# Patient Record
Sex: MALE | Race: WHITE | NOT HISPANIC OR LATINO | URBAN - METROPOLITAN AREA
[De-identification: names, ages, dates, MRNs, and addresses within clinical notes are randomized per-mention and may not be internally consistent; named-entity substitution may affect disease eponyms.]

---

## 2017-01-10 ENCOUNTER — TELEPHONE (OUTPATIENT)
Dept: NUTRITION | Facility: CLINIC | Age: 22
End: 2017-01-10

## 2017-02-08 DIAGNOSIS — E70.1 PHENYLKETONURIA (PKU) (H): Primary | ICD-10-CM

## 2017-02-08 PROCEDURE — 84510 ASSAY OF TYROSINE: CPT | Performed by: PEDIATRICS

## 2017-02-16 ENCOUNTER — TELEPHONE (OUTPATIENT)
Dept: NUTRITION | Facility: CLINIC | Age: 22
End: 2017-02-16

## 2017-02-16 LAB
PHE SERPL-MCNC: 3.9 MG/DL (ref 0.5–1.6)
TYROSINE SERPL-MCNC: 1.9 MG/DL (ref 0.6–2.4)

## 2017-04-03 PROCEDURE — 84510 ASSAY OF TYROSINE: CPT | Performed by: PEDIATRICS

## 2017-04-11 DIAGNOSIS — E70.1 PHENYLKETONURIA (PKU) (H): Primary | ICD-10-CM

## 2017-04-12 LAB
PHE SERPL-MCNC: 10.2 MG/DL (ref 0.5–1.6)
TYROSINE SERPL-MCNC: 1.4 MG/DL (ref 0.6–2.4)

## 2017-06-04 DIAGNOSIS — E70.1 PHENYLKETONURIA (PKU) (H): ICD-10-CM

## 2017-06-04 PROCEDURE — 84510 ASSAY OF TYROSINE: CPT | Performed by: PEDIATRICS

## 2017-06-09 LAB
PHE SERPL-MCNC: 12.2 MG/DL (ref 0.5–1.6)
TYROSINE SERPL-MCNC: 1.6 MG/DL (ref 0.6–2.4)

## 2017-06-20 ENCOUNTER — TELEPHONE (OUTPATIENT)
Dept: NUTRITION | Facility: CLINIC | Age: 22
End: 2017-06-20

## 2017-08-07 DIAGNOSIS — E70.1 PHENYLKETONURIA (PKU) (H): ICD-10-CM

## 2017-08-07 PROCEDURE — 84510 ASSAY OF TYROSINE: CPT | Performed by: PEDIATRICS

## 2017-08-14 LAB
PHE SERPL-MCNC: 10.8 MG/DL (ref 0.5–1.6)
TYROSINE SERPL-MCNC: 1.8 MG/DL (ref 0.6–2.4)

## 2017-08-15 ENCOUNTER — TELEPHONE (OUTPATIENT)
Dept: NUTRITION | Facility: CLINIC | Age: 22
End: 2017-08-15

## 2017-09-29 DIAGNOSIS — E70.1 PHENYLKETONURIA (PKU) (H): ICD-10-CM

## 2017-09-29 PROCEDURE — 84510 ASSAY OF TYROSINE: CPT | Performed by: PEDIATRICS

## 2017-10-06 LAB
PHE SERPL-MCNC: 10.6 MG/DL (ref 0.5–1.6)
TYROSINE SERPL-MCNC: 1.7 MG/DL (ref 0.6–2.4)

## 2017-10-10 ENCOUNTER — TELEPHONE (OUTPATIENT)
Dept: NUTRITION | Facility: CLINIC | Age: 22
End: 2017-10-10

## 2017-12-31 ENCOUNTER — HOSPITAL ENCOUNTER (OUTPATIENT)
Facility: CLINIC | Age: 22
Setting detail: SPECIMEN
Discharge: HOME | End: 2017-12-31
Admitting: PEDIATRICS
Payer: COMMERCIAL

## 2017-12-31 PROCEDURE — 84510 ASSAY OF TYROSINE: CPT | Performed by: PEDIATRICS

## 2018-01-06 DIAGNOSIS — E70.1 PHENYLKETONURIA (PKU) (H): ICD-10-CM

## 2018-01-09 LAB
PHE SERPL-MCNC: 11.9 MG/DL (ref 0.5–1.6)
TYROSINE SERPL-MCNC: 1.9 MG/DL (ref 0.6–2.4)

## 2018-01-12 ENCOUNTER — TELEPHONE (OUTPATIENT)
Dept: NUTRITION | Facility: CLINIC | Age: 23
End: 2018-01-12

## 2018-03-01 DIAGNOSIS — E70.1 PHENYLKETONURIA (PKU) (H): ICD-10-CM

## 2018-03-01 PROCEDURE — 84510 ASSAY OF TYROSINE: CPT | Performed by: PEDIATRICS

## 2018-03-08 LAB
PHE SERPL-MCNC: 10.6 MG/DL (ref 0.5–1.6)
TYROSINE SERPL-MCNC: 1.9 MG/DL (ref 0.6–2.4)

## 2018-03-09 ENCOUNTER — TELEPHONE (OUTPATIENT)
Dept: NUTRITION | Facility: CLINIC | Age: 23
End: 2018-03-09

## 2018-05-14 DIAGNOSIS — E70.1 PHENYLKETONURIA (PKU) (H): ICD-10-CM

## 2018-05-14 PROCEDURE — 84510 ASSAY OF TYROSINE: CPT | Performed by: PEDIATRICS

## 2018-05-21 LAB
PHE SERPL-MCNC: 9.7 MG/DL (ref 0.5–1.6)
TYROSINE SERPL-MCNC: 1.2 MG/DL (ref 0.6–2.4)

## 2018-05-22 ENCOUNTER — TELEPHONE (OUTPATIENT)
Dept: NUTRITION | Facility: CLINIC | Age: 23
End: 2018-05-22

## 2018-05-22 NOTE — TELEPHONE ENCOUNTER
Clinical Nutrition Services - brief note     PHE result collected 05/14/2018 given to patient's mother via e-mail = 9.7 mg/dL.     Linda Lackey RD, LD  Unit Pager: [phone]

## 2018-09-21 DIAGNOSIS — E70.1 PHENYLKETONURIA (PKU) (H): ICD-10-CM

## 2018-09-21 PROCEDURE — 84510 ASSAY OF TYROSINE: CPT | Performed by: PEDIATRICS

## 2018-09-25 LAB
PHE SERPL-MCNC: 7.6 MG/DL (ref 0.5–1.6)
TYROSINE SERPL-MCNC: 1.6 MG/DL (ref 0.6–2.4)

## 2018-09-28 ENCOUNTER — TELEPHONE (OUTPATIENT)
Dept: NUTRITION | Facility: CLINIC | Age: 23
End: 2018-09-28

## 2018-12-03 DIAGNOSIS — E70.1 PHENYLKETONURIA (PKU) (H): ICD-10-CM

## 2018-12-03 PROCEDURE — 84510 ASSAY OF TYROSINE: CPT | Performed by: PEDIATRICS

## 2018-12-11 LAB
PHE SERPL-MCNC: 10.7 MG/DL (ref 0.5–1.6)
TYROSINE SERPL-MCNC: 1.7 MG/DL (ref 0.6–2.4)

## 2019-01-21 ENCOUNTER — OFFICE VISIT (OUTPATIENT)
Dept: PEDIATRICS | Facility: CLINIC | Age: 24
End: 2019-01-21
Attending: PEDIATRICS
Payer: COMMERCIAL

## 2019-01-21 ENCOUNTER — OFFICE VISIT (OUTPATIENT)
Dept: CONSULT | Facility: CLINIC | Age: 24
End: 2019-01-21

## 2019-01-21 ENCOUNTER — ALLIED HEALTH/NURSE VISIT (OUTPATIENT)
Dept: PEDIATRICS | Facility: CLINIC | Age: 24
End: 2019-01-21
Attending: DIETITIAN, REGISTERED
Payer: COMMERCIAL

## 2019-01-21 VITALS
SYSTOLIC BLOOD PRESSURE: 136 MMHG | DIASTOLIC BLOOD PRESSURE: 80 MMHG | HEIGHT: 69 IN | WEIGHT: 160.05 LBS | HEART RATE: 93 BPM | BODY MASS INDEX: 23.71 KG/M2

## 2019-01-21 DIAGNOSIS — E70.1 PHENYLKETONURIA (PKU) (H): Primary | ICD-10-CM

## 2019-01-21 DIAGNOSIS — N20.0 KIDNEY STONE: ICD-10-CM

## 2019-01-21 DIAGNOSIS — Z71.9 ENCOUNTER FOR COUNSELING: Primary | ICD-10-CM

## 2019-01-21 PROCEDURE — 97803 MED NUTRITION INDIV SUBSEQ: CPT | Mod: XU | Performed by: DIETITIAN, REGISTERED

## 2019-01-21 PROCEDURE — G0463 HOSPITAL OUTPT CLINIC VISIT: HCPCS | Mod: ZF

## 2019-01-21 ASSESSMENT — PAIN SCALES - GENERAL: PAINLEVEL: NO PAIN (0)

## 2019-01-21 ASSESSMENT — MIFFLIN-ST. JEOR: SCORE: 1715.99

## 2019-01-21 NOTE — NURSING NOTE
"[facility] [[id number]]  Chief Complaint   Patient presents with     RECHECK     follow up     Initial /80   Pulse 93   Ht 5' 9.29\" (176 cm)   Wt 160 lb 0.9 oz (72.6 kg)   HC 58 cm (22.84\")   BMI 23.44 kg/m   Estimated body mass index is 23.44 kg/m  as calculated from the following:    Height as of this encounter: 5' 9.29\" (176 cm).    Weight as of this encounter: 160 lb 0.9 oz (72.6 kg).  Medication Reconciliation: complete  "

## 2019-01-21 NOTE — PROGRESS NOTES
A partnership of [place] and [facility]   Advanced Therapies  [facility] [address]  [address] [facility] [address]  Phone: [phone]  Fax: [phone]  Date: 2019      Patient:  Rashaad Silva   :   1995   MRN:     [id number]      Rashaad Silva   [address] [facility] [address]    Dear Alvaro Fleming and Rashaad Silva,    CHIEF COMPLAINT:     Thank you for sending Rashaad Silva to the PKU and Maternal PKU Clinic at the [facility] for consultation regarding phenylketonuria (PKU). This patient is 23 year old and comes for evaluation and treatment.      Since the last visit, there have been no hospitalizations, emergency department visits, or other major changes in medical care.    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 Provider to review         FAMILY HISTORY: A brief family medical history was reviewed.  MEDICATIONS:   Current Outpatient Medications   Medication Sig     multivitamin, therapeutic with minerals (MULTI-VITAMIN) TABS Take 1 tablet by mouth daily     Sapropterin Dihydrochloride (KUVAN) 100 MG PACK Take 100 mg by mouth daily with food (Patient not taking: Reported on 2019)     Sapropterin Dihydrochloride (KUVAN) 500 MG PACK Take 1,500 mg by mouth daily with food (Patient not taking: Reported on 2019)     No current facility-administered medications for this visit.      REVIEW OF SYSTEMS: The review of systems negative for new eye,  ear, heart, lung, liver, spleen, gastrointestinal, bone, muscle, integumentary, endocrinologic, brain or psychiatric issues except as noted above.  PHYSICAL EXAMINATION:   General: The patient is oriented to person, place and time at an age-appropriate manner.   HEENT: The facial features are normal and symmetric.   The gaze is conjugate and extraocular motions are full and intact.The pupils are equal, round and reactive to light.  The ears are of normal position and configuration and hearing is grossly normal.  The oropharynx is benign and the tongue protrudes normally without fasciculations.  Neck: The neck is supple with full range of motion  Chest: The chest is of normal configuration and clear by auscultation.   Heart: A normal S1 and S2 are heard without murmurs or gallops.  Abdomen: The abdomen is soft and benign without organomegaly.   Extremities: The extremities are of normal configuration without contractures nor hyperlaxities. Strength and tone appear to be normal and symmetric. Deep tendon reflexes are normal at the biceps, patellar and ankles, and there is no clonus at the ankles.   Integument: The integument is  of normal appearance without significant changes in pigmentation, birthmarks, or lesions.  Neurologic:  Mental Status Exam:  Alert, awake. Fully oriented. No dysarthria, no dysphasia. Speech of normal fluency.  Cranial Nerves:  PERRLA, EOMs intact, no nystagmus, facial movements symmetric. No atrophy or fasciculations.    Motor:  Normal tone in all four extremities, no atrophy or fasciculations. 5/5 strength bilaterally in shoulder abduction, elbow extensors and flexors, , hip flexors, knee extensors and flexors. No tremors.  Sensory:  Negative Romberg.  Reflexes:  2+ and symmetric in biceps, patellar, Achilles; There is no clonus at the ankles.  Gait:  Normal gait; normal arm swing and stance.ankles.    LABORATORY RESULTS: Laboratory studies from the past year were reviewed.   "Historically, Luis Eduardo has shown blood phenylalanine levels slightly over the surgery recommended level, ranging 7.6-10.6 in 2018.  ASSESSMENT:  1.) Phenylketonuria (PKU).  2.) Moderately good control with blood phe levels aiming at levels fo 6 mg/dL or lower.  In February 2019, he said the blood level in which gave a result of 7.6 mg/dL.  PLAN/RECOMMENDATIONS:    1.) Low phenylalanine diet.  2.) Send blood \"tyrosine and phenylalanine\" levels monthly.  3.) Metabolic PKU Dietician Consult today.  4.) Clinical Pharmacotherapy consultation with Sofie Mckenzie PharmD.  5.) Return to PKU Clinic in 24 months.    FOLLOW-UP PLAN:  If you are returning to clinic to review specific laboratory tests, please call the Genetic Counselor (see phone numbers below)  to confirm that we have received all of the results from reference laboratories prior to your appointment. If we have not received all of the test results, please discuss re-scheduling your appointment.    I spent 30 minutes face-to-face with the patient reviewing the chief complaint, past medical history, and obtaining a review of systems as well as doing a physical examination; more than 50% of this time was spent in counseling regarding the nature of PKU disease, the impact in adulthood of having both high, or alternatively low, blood phenylalanine levels, in particular the value of getting blood phenylalanine levels to less than 6  mi's lliram per deciliter, next regularly recommended goal we discussed new therapies including his past experience with Kuvan medication, as well as a new future medication Palynziq  (pegvaliase-pqpz) which is injectable, just coming into practice for the first time after FDA approval, and the challenges of getting a dose adjustment over the first year of treatment, and also the known, associated issues, chiefly cutaneous rashes and how that would be dealt with..    With warmest regards,     Andrews Villanueva PhD MD  Professor of " Pediatrics, and  [place] of Human Genetics    Watch your mailbox for an invitation to the annual PKU Patient & Family Seminar     Saturday, October 14, 2017   [facility] Continuing Education and Conference [facility]   [facility], [facility]   [address] [place] [address]      Appointments: [phone]      Monday mornings: Advanced Therapies for Lysosomal Diseases Clinic   Monday afternoons: PKU Clinic, Metabolism Clinic, and Genetics Clinic    Pharmacotherapy Consultant:  Sofie Cobb, PharmD, Pharmacotherapy for Metabolic Disorders (PIMD): [phone]  Gray Bowman, PharmD, Pharmacotherapy for Metabolic Disorders (PIMD): [phone]    Genetic Counselor:  Elli Perera MS, CGC (Genetic test Results): [phone]  Era Calles MS, GCG, (Genetic test results: [phone])    Metabolic Dietician:  Kassy Barajas, Registered Dietician: [phone]  Linda Lackey, Registered Dietician: [phone]    :  SABRINA Perkins, [facility], AC, Clinical , [phone]    Advanced Therapies Clinic Scheduler:  Edith Carbajal, [phone]      Copy to Primary Care Practitioner:  Dr. Alvaro Scruggs MD  [facility]  [address] [facility] [address]  [place] [address]      Copy  to patient:  Rashaad Cuevachner   [address] [place] [id number]

## 2019-01-21 NOTE — LETTER
2019    RE: Rashaad Silva   [address] [address]                                                       A partnership of [place] and [facility]   Advanced Therapies  [facility] [address]  [address] [place] [address]  Phone: [phone]  Fax: [phone]  Date: 2019      Patient:  Rashaad Silva   :   1995   MRN:     [id number]      Rashaad Silva   [address] [address]    Dear Alvaro Fleming and Rashaad CATHY Ricardo,    CHIEF COMPLAINT:     Thank you for sending Rashaad Silva to the PKU and Maternal PKU Clinic at the [facility] for consultation regarding phenylketonuria (PKU). This patient is 23 year old and comes for evaluation and treatment.      Since the last visit, there have been no hospitalizations, emergency department visits, or other major changes in medical care.    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 Provider to review         FAMILY HISTORY: A brief family medical history was reviewed.  MEDICATIONS:   Current Outpatient Medications   Medication Sig     multivitamin, therapeutic with minerals (MULTI-VITAMIN) TABS Take 1 tablet by mouth daily     Sapropterin Dihydrochloride (KUVAN) 100 MG PACK Take 100 mg by mouth daily with food (Patient not taking: Reported on 2019)     Sapropterin Dihydrochloride (KUVAN) 500 MG PACK Take 1,500 mg by mouth daily with food (Patient not taking: Reported on 2019)     No current facility-administered medications  for this visit.      REVIEW OF SYSTEMS: The review of systems negative for new eye, ear, heart, lung, liver, spleen, gastrointestinal, bone, muscle, integumentary, endocrinologic, brain or psychiatric issues except as noted above.  PHYSICAL EXAMINATION:   General: The patient is oriented to person, place and time at an age-appropriate manner.   HEENT: The facial features are normal and symmetric.   The gaze is conjugate and extraocular motions are full and intact.The pupils are equal, round and reactive to light.  The ears are of normal position and configuration and hearing is grossly normal.  The oropharynx is benign and the tongue protrudes normally without fasciculations.  Neck: The neck is supple with full range of motion  Chest: The chest is of normal configuration and clear by auscultation.   Heart: A normal S1 and S2 are heard without murmurs or gallops.  Abdomen: The abdomen is soft and benign without organomegaly.   Extremities: The extremities are of normal configuration without contractures nor hyperlaxities. Strength and tone appear to be normal and symmetric. Deep tendon reflexes are normal at the biceps, patellar and ankles, and there is no clonus at the ankles.   Integument: The integument is  of normal appearance without significant changes in pigmentation, birthmarks, or lesions.  Neurologic:  Mental Status Exam:  Alert, awake. Fully oriented. No dysarthria, no dysphasia. Speech of normal fluency.  Cranial Nerves:  PERRLA, EOMs intact, no nystagmus, facial movements symmetric. No atrophy or fasciculations.    Motor:  Normal tone in all four extremities, no atrophy or fasciculations. 5/5 strength bilaterally in shoulder abduction, elbow extensors and flexors, , hip flexors, knee extensors and flexors. No tremors.  Sensory:  Negative Romberg.  Reflexes:  2+ and symmetric in biceps, patellar, Achilles; There is no clonus at the ankles.  Gait:  Normal gait; normal arm swing and  "stance.ankles.    LABORATORY RESULTS: Laboratory studies from the past year were reviewed.  Historically, Luis Eduardo has shown blood phenylalanine levels slightly over the surgery recommended level, ranging 7.6-10.6 in 2018.  ASSESSMENT:  1.) Phenylketonuria (PKU).  2.) Moderately good control with blood phe levels aiming at levels fo 6 mg/dL or lower.  In February 2019, he said the blood level in which gave a result of 7.6 mg/dL.  PLAN/RECOMMENDATIONS:    1.) Low phenylalanine diet.  2.) Send blood \"tyrosine and phenylalanine\" levels monthly.  3.) Metabolic PKU Dietician Consult today.  4.) Clinical Pharmacotherapy consultation with Sofie Mckenzie PharmD.  5.) Return to PKU Clinic in 24 months.    FOLLOW-UP PLAN:  If you are returning to clinic to review specific laboratory tests, please call the Genetic Counselor (see phone numbers below)  to confirm that we have received all of the results from reference laboratories prior to your appointment. If we have not received all of the test results, please discuss re-scheduling your appointment.    I spent 30 minutes face-to-face with the patient reviewing the chief complaint, past medical history, and obtaining a review of systems as well as doing a physical examination; more than 50% of this time was spent in counseling regarding the nature of PKU disease, the impact in adulthood of having both high, or alternatively low, blood phenylalanine levels, in particular the value of getting blood phenylalanine levels to less than 6  mi's lliram per deciliter, next regularly recommended goal we discussed new therapies including his past experience with Kuvan medication, as well as a new future medication Palynziq  (pegvaliase-pqpz) which is injectable, just coming into practice for the first time after FDA approval, and the challenges of getting a dose adjustment over the first year of treatment, and also the known, associated issues, chiefly cutaneous rashes and how that would be " dealt with..    With warmest regards,     Andrews Villanueva PhD MD  Professor of Pediatrics, and  [place] of Human Genetics    Watch your mailbox for an invitation to the annual PKU Patient & Family Seminar     Saturday, October 14, 2017   [facility] Continuing Education and Conference Center   [facility]   [address] [place] [address]      Appointments: [phone]      Monday mornings: Advanced Therapies for Lysosomal Diseases Clinic   Monday afternoons: PKU Clinic, Metabolism Clinic, and Genetics Clinic    Pharmacotherapy Consultant:  Sofie Cobb, PharmD, Pharmacotherapy for Metabolic Disorders (PIMD): [phone]  Gray Bowman, PharmD, Pharmacotherapy for Metabolic Disorders (PIMD): [phone]    Genetic Counselor:  Elli Perera MS, CGC (Genetic test Results): [phone]  Era Calles MS, GCG, (Genetic test results: [phone])    Metabolic Dietician:  Kassy Barajas, Registered Dietician: [phone]  Linda Lackey, Registered Dietician: [phone]    :  Argelia Jacobsen, MSW, [facility], ACM, Clinical , [phone]    Advanced Therapies Clinic Scheduler:  Edith Carbajal, [phone]        Copy to Primary Care Practitioner:  Dr. Alvaro Scruggs MD  [facility]  [address] [facility] [address]  [place] [address]    Copy  to patient:  Rashaad CATHY Silva   [address] [place] [id number]

## 2019-01-21 NOTE — LETTER
1/21/2019      RE: Rashaad Silva   [address] [facility] [address]       ADVANCED THERAPIES CLINIC  SOCIAL WORK NOTE      DATA:     Rashaad presented with his parents and brother for the appointment. Rashaad has PKU and has been managing by adjusting his diet.  He has a history of trying Kuvan but prefers not to restart due to upset stomach at the time of use. He does utilize the [place] PKU Food Program.    Rashaad reports that he is residing at home with his parents, who he appears to get along well with.  He works full-time for a [employer] company as a .  He denied any need for additional resources at this time. Housing, finances, insurance are stable.    Rashaad's parents note that they hope their sons will come on their own for their appointments next time, as they have entered adulthood, but family remains a strong source of support. Rashaad signed an OKSANA to allow communication with his mother, particularly regarding Phe levels.    INTERVENTION:     Assessment of patient's strengths and needs  Encouraged ongoing self-care and continued attention to self-care  Provided positive feedback and encouragement  Case Coordination  Financial Assessment    ASSESSMENT:     Rashaad is motivated to care for his health and manage PKU. No additional social work needs have been identified during this encounter.     PLAN:     Rashaad denied any further needs from this worker, who remains available should any needs arise.      SABRINA Chen, JIMI  Pediatric Genetics, Adult & Pediatric PKU and Clinical Trials Services     [facility]  [address] [place] [address], [address]  [place] [address] [email or web address]  Office: [phone]        JIMI Chen

## 2019-01-22 NOTE — PROGRESS NOTES
ADVANCED THERAPIES CLINIC  SOCIAL WORK NOTE      DATA:     Rashaad presented with his parents and brother for the appointment. Rashaad has PKU and has been managing by adjusting his diet.  He has a history of trying Kuvan but prefers not to restart due to upset stomach at the time of use. He does utilize the [place] PKU Food Program.    Rashaad reports that he is residing at home with his parents, who he appears to get along well with.  He works full-time for a manufacturing company as a .  He denied any need for additional resources at this time. Housing, finances, insurance are stable.    Rashaad's parents note that they hope their sons will come on their own for their appointments next time, as they have entered adulthood, but family remains a strong source of support. Rashaad signed an OKSANA to allow communication with his mother, particularly regarding Phe levels.    INTERVENTION:     Assessment of patient's strengths and needs  Encouraged ongoing self-care and continued attention to self-care  Provided positive feedback and encouragement  Case Coordination  Financial Assessment    ASSESSMENT:     Rashaad is motivated to care for his health and manage PKU. No additional social work needs have been identified during this encounter.     PLAN:     Rashaad denied any further needs from this worker, who remains available should any needs arise.      SABRINA Chen, [facility]  Pediatric Genetics, Adult & Pediatric PKU and Clinical Trials Services     [facility]  [address] [place] [address], [address]  [place] [address] [email or web address]  Office: [phone]

## 2019-01-24 NOTE — PROGRESS NOTES
CLINICAL NUTRITION SERVICES - PEDIATRIC ASSESSMENT NOTE     REASON FOR ASSESSMENT  Rashaad Silva is a 23 year old male seen by the dietitian for consult regarding PKU.       ANTHROPOMETRICS  Height: 175.4 cm (69 in)  Weight: 72.7 kg  BMI: 23.7     Comments: weight down ~14 lbs since visit 2 years ago     NUTRITION HISTORY  Patient follows a low protein diet (undocumented PHE goal; likely 300-450 mg/day).     Usual intake stated as:     Patient states that with his work schedule he typically will eat one large meal a day.  He does not go into specifics on what is typically eaten, but in previous visits common foods are low protein noodles with red sauce or low protein usha sauce, bagel/cream cheese, chips, pizza.  He is here with parents and sibling today and lives at home with them.  Mom notes concern he does not eat very well and has lost weight.  He states it is mainly just due to his schedule and being busy.     PKU FORMULA  Phlexy-10 Drink Mix - 6 packets daily (mixes with apple juice)     This would provide 414 kcals (5 kcal/kg), 48 grams protein (0.7 g/kg/day), no vitamins/mineral - however, patient states most days he is taking only 3 pkts.       LABS  Labs reviewed;               PHE     TYR  12/3 10.7  1.7   7.6 1.6   9.7 1.2  3/1 10.6 1.9  Av.7 1.6     MEDICATIONS  Medications reviewed; includes daily MVI  NOTE:  responsive to Kuvan but discontinued due to side effects     ASSESSED NUTRITION NEEDS:  Estimated Energy Needs: 30-35 kcal/kg  Estimated Protein Needs: RDA for age = 0.8 g/kg, optimal range 0.8-1.2  Estimated PHE Needs: typically < 10 mg/kg/day  Micronutrient Needs: 1300 mg calcium, 600 mg Vitamin D, 11 mg iron     NUTRITION DIAGNOSIS:  Impaired nutrient utilization related to diagnosis of PKU as evidenced by elevated serum PHE levels.     INTERVENTIONS  Nutrition Prescription  Meet 100% estimated kcal, protein, PHE, vitamin/mineral needs through low protein diet + PKU  formula    Nutrition Education:   Provided nutrition education on continuing low protein diet + PKU formula.    Discussed weight/changes, intakes, and most recent PHE levels.    -Discussed nutritional status and suboptimal formula intake which likely means he is not getting enough phe-free protein, as well as other nutrition.  Discussed benefits of possibly trying a other GMP formula (patient has tried Glytactin RTD) to supply more nutrition and have lower volume intake.  Patient agreeable to trying options such as Glytactin RTD Lite, Sphere, Air, and Phenylade GMP.  -Discussed more balanced nutrition of trying to eat 3 meals/day and why this is important for energy levels, PHE levels, overall health.    -Encouraged patient to continue to send levels and aim for <6 mg/dL; reviewed symptoms of high PHE levels in adults and what this can impact.    Goals  1. Weight maintenance  2. Meet >85% estimated kcal, protein, PHE, vitamin/mineral needs through low protein diet + PKU formula  3. PHE levels 2-6 mg/dL    FOLLOW UP/MONITORING  Energy Intake - adequacy  Macronutrient intake - low protein + PKU formula  Anthropometric measurements - weight maintenance     Time spent with patient: 15 minutes

## 2019-02-19 PROCEDURE — 84510 ASSAY OF TYROSINE: CPT | Performed by: PEDIATRICS

## 2019-02-26 LAB
PHE SERPL-MCNC: 7.6 MG/DL (ref 0.5–1.6)
TYROSINE SERPL-MCNC: 2.3 MG/DL (ref 0.6–2.4)

## 2019-03-01 ENCOUNTER — TELEPHONE (OUTPATIENT)
Dept: NUTRITION | Facility: CLINIC | Age: 24
End: 2019-03-01

## 2019-06-16 DIAGNOSIS — E70.1 PHENYLKETONURIA (PKU) (H): ICD-10-CM

## 2019-06-16 PROCEDURE — 84510 ASSAY OF TYROSINE: CPT | Performed by: PEDIATRICS

## 2019-06-24 LAB
PHE SERPL-MCNC: 9.4 MG/DL (ref 0.5–1.6)
TYROSINE SERPL-MCNC: 1.7 MG/DL (ref 0.6–2.4)

## 2019-06-25 ENCOUNTER — TELEPHONE (OUTPATIENT)
Dept: NUTRITION | Facility: CLINIC | Age: 24
End: 2019-06-25

## 2019-08-18 DIAGNOSIS — E70.1 PHENYLKETONURIA (PKU) (H): ICD-10-CM

## 2019-08-18 PROCEDURE — 84510 ASSAY OF TYROSINE: CPT | Performed by: PEDIATRICS

## 2019-08-23 LAB
PHE SERPL-MCNC: 8.8 MG/DL (ref 0.5–1.6)
TYROSINE SERPL-MCNC: 1.3 MG/DL (ref 0.6–2.4)

## 2019-08-27 ENCOUNTER — TELEPHONE (OUTPATIENT)
Dept: NUTRITION | Facility: CLINIC | Age: 24
End: 2019-08-27

## 2019-10-13 PROCEDURE — 84510 ASSAY OF TYROSINE: CPT | Performed by: PEDIATRICS

## 2019-10-21 LAB
PHE SERPL-MCNC: 8.9 MG/DL (ref 0.5–1.6)
TYROSINE SERPL-MCNC: 1.6 MG/DL (ref 0.6–2.4)

## 2019-10-25 DIAGNOSIS — E70.1 PHENYLKETONURIA (PKU) (H): Primary | ICD-10-CM

## 2019-10-31 ENCOUNTER — TELEPHONE (OUTPATIENT)
Dept: NUTRITION | Facility: CLINIC | Age: 24
End: 2019-10-31

## 2019-12-14 DIAGNOSIS — E70.1 PHENYLKETONURIA (PKU) (H): ICD-10-CM

## 2019-12-14 PROCEDURE — 84510 ASSAY OF TYROSINE: CPT | Performed by: PEDIATRICS

## 2019-12-19 LAB
PHE SERPL-MCNC: 12.3 MG/DL (ref 0.5–1.6)
TYROSINE SERPL-MCNC: 2.1 MG/DL (ref 0.6–2.4)

## 2019-12-20 ENCOUNTER — TELEPHONE (OUTPATIENT)
Dept: NUTRITION | Facility: CLINIC | Age: 24
End: 2019-12-20

## 2019-12-23 ENCOUNTER — TELEPHONE (OUTPATIENT)
Dept: NUTRITION | Facility: CLINIC | Age: 24
End: 2019-12-23

## 2020-07-26 DIAGNOSIS — E70.1 PHENYLKETONURIA (PKU) (H): ICD-10-CM

## 2020-07-26 PROCEDURE — 84510 ASSAY OF TYROSINE: CPT | Performed by: PEDIATRICS

## 2020-07-31 LAB
PHE SERPL-MCNC: 11.5 MG/DL (ref 0.5–1.6)
TYROSINE SERPL-MCNC: 1.4 MG/DL (ref 0.6–2.4)

## 2020-08-03 ENCOUNTER — TELEPHONE (OUTPATIENT)
Dept: NUTRITION | Facility: CLINIC | Age: 25
End: 2020-08-03

## 2020-12-28 ENCOUNTER — TELEPHONE (OUTPATIENT)
Dept: CONSULT | Facility: CLINIC | Age: 25
End: 2020-12-28

## 2020-12-28 NOTE — TELEPHONE ENCOUNTER
I tried calling the patient's mother but there was no answer and I was unable to leave a voice message as well. I wanted to set up an appointment for Rashaad to be seen virtually in the PKU clinic with Dr Villanueva.       Ligia Sosa

## 2021-03-15 ENCOUNTER — TELEPHONE (OUTPATIENT)
Dept: CONSULT | Facility: CLINIC | Age: 26
End: 2021-03-15

## 2021-03-15 NOTE — TELEPHONE ENCOUNTER
I left the patient a message about setting a follow-up virtual visit with Dr Villanueva for this following year. Dr. Villanueva would like to do a virtual visit follow-up on how the patient s progress is coming along. Please feel free to contact Dr. Villanueva s coordinator Ligia at [phone] or the Genetics appointment scheduling at [phone] and we ll be happy to help coordinate an appointment with the patient.    Ligia Sosa

## 2021-04-19 ENCOUNTER — VIRTUAL VISIT (OUTPATIENT)
Dept: PEDIATRICS | Facility: CLINIC | Age: 26
End: 2021-04-19
Attending: PEDIATRICS
Payer: COMMERCIAL

## 2021-04-19 ENCOUNTER — VIRTUAL VISIT (OUTPATIENT)
Dept: NUTRITION | Facility: CLINIC | Age: 26
End: 2021-04-19
Attending: DIETITIAN, REGISTERED
Payer: COMMERCIAL

## 2021-04-19 DIAGNOSIS — E70.1 PHENYLKETONURIA (PKU) (H): Primary | Chronic | ICD-10-CM

## 2021-04-19 DIAGNOSIS — E70.1 PHENYLKETONURIA (PKU) (H): ICD-10-CM

## 2021-04-19 PROCEDURE — 97803 MED NUTRITION INDIV SUBSEQ: CPT | Mod: GT | Performed by: DIETITIAN, REGISTERED

## 2021-04-19 PROCEDURE — 99213 OFFICE O/P EST LOW 20 MIN: CPT | Performed by: PEDIATRICS

## 2021-04-19 RX ORDER — SAPROPTERIN DIHYDROCHLORIDE 500 MG/1
1500 POWDER, FOR SOLUTION ORAL
Qty: 90 EACH | Refills: 11 | Status: SHIPPED | OUTPATIENT
Start: 2021-04-19 | End: 2021-11-18

## 2021-04-19 NOTE — PROGRESS NOTES
"How would you like to obtain your AVS? Mail a copy  If the video visit is dropped, the invitation should be resent by: Send to e-mail at: [email or web address]  Will anyone else be joining your video visit? Dr. Gray Bowman (Pharmacist), Dr. Mariela De Jesus (Research physician) and Dr. Joseline Zarco (Clinical physician)     ULISES Waters    Video-Visit Details    Type of service:  Video Visit    Video Start Time: 3:00 PM  Video End Time (time video stopped): 3:40 PM    Originating Location (pt. Location): Home    Distant Location (provider location):  [facility]    Mode of Communication:  Video Conference via [facility]                                                      A partnership of [place] and [facility]   Advanced Therapies  [facility] [address] [place] [address]  Phone: [phone]  Fax: [phone]  Date: 2021      Patient:  Rashaad Silva   :   1995   MRN:     [id number]      Rashaad Silva   [address] [facility] [address]    Dear Rashaad Silva,    CHIEF COMPLAINT:     Thank you for sending Rashaad Silva, a 25 year old male, to the [facility] \"PKU and Maternal PKU Clinic\" for consultation and treatment of phenylketonuria (PKU).    PAST MEDICAL HISTORY:    From the oral history, and medical records that are available, these items are noted:    Patient Active Problem List    Diagnosis Date Noted     Kidney stone 2019     Priority: Medium     Nephrolithiasis 2013     Priority: Medium     Microscopic hematuria 2013     Priority: Medium     Elevated homocysteine 2013     Priority: Medium     B-complex deficiency 10/12/2012     Priority: Medium     Problem list name updated by automated process. Provider to review       PHENYLKETONURIA - PKU, Kuvan responsive      Priority: Sharon Neil has not been on Kuvan for some time due to insurance " issues.He was previously seen on 1/20/2019. No major hospitalizations or surgeries since then.     FAMILY HISTORY: A brief family medical history was reviewed.  REVIEW OF SYSTEMS: The review of systems negative for new eye, ear, heart, lung, liver, spleen, gastrointestinal, bone, muscle, integumentary, endocrinologic, brain or psychiatric issues except as noted above.    PHYSICAL EXAMINATION:   General: The patient is oriented to person, place and time at an age-appropriate manner.   HEENT: The facial features are normal and symmetric. The ears are of normal position and configuration and hearing is grossly normal.  The oropharynx is benign and the tongue protrudes normally without fasciculations.  Neck: The neck appears to be supple with full range of motion  Chest: Does not appear to be tachypneic or in any respiratory distress.  Heart:  Kofi Franklin  appears well perfused.  Abdomen: Not examined.  Extremities: The extremities are of normal configuration.  Back: The back was straight without scoliosis.   Integument: The visible part of the integument is of normal appearance without significant changes in pigmentation, birthmarks, or lesions.  Neuromuscular:  Mental Status Exam: Alert, awake. Fully oriented. No dysarthria, no dysphasia. Speech of normal fluency.     LABORATORY RESULTS: Laboratory studies from the past year were reviewed.  Results for KOFI FRANKLIN (MRN [id number]) as of 4/19/2021 15:02   Ref. Range 6/16/2019 13:15 8/18/2019 15:00 10/13/2019 15:30 12/14/2019 12:00 7/26/2020 14:00   Phenylalanine mg/dl Latest Ref Range: 0.5 - 1.6 mg/dL 9.4 (H) 8.8 (H) 8.9 (H) 12.3 (H) 11.5 (H)   Results for KOFI FRANKLIN (MRN [id number]) as of 4/19/2021 15:02   Ref. Range 6/16/2019 13:15 8/18/2019 15:00 10/13/2019 15:30 12/14/2019 12:00 7/26/2020 14:00   Tyrosine Latest Ref Range: 0.6 - 2.4 mg/dL 1.7 1.3 1.6 2.1 1.4       ASSESSMENT:  1. PKU  2. On Kuvan. But Kofi has not been taking it due to  "insurance issues  3. High Phe levels  4. Not on PKU formulas  5. On Phe restricted diet    PLAN/RECOMMENDATIONS:  1. Continue low phenylalanine diet.  2. Send blood \"tyrosine and phenylalanine\" levels monthly.  3. Metabolic PKU Dietician Consult today.  4. Clinical Pharmacotherapy consultation with Gray Bowman, PharmD.  5. Return to PKU Clinic in  3 months to evaluate Kuvan re-initiation      With warmest regards,     Andrews Villanueva PhD MD  Professor of Pediatrics  Medical Director, Advanced Therapies Program  Medical Director, PKU and Maternal PKU Clinic    Appointments: [phone]      Monday: [facility]ies for Lysosomal Diseases Clinic   Monday PM: PKU Clinic    Kassy Barajas, Registered Dietician: [phone]  Gena Gomez MS, [facility] [phone]  Gray Bowman, PharmD, Pharmacotherapy Specialist: [phone]      Copy to Primary Care Practitioner:      Dr. Alvaro Scruggs  [facility]   [address] [facility] [address]  [place] [facility] [address]    Copy  to patient:  JYOTHI FRANKLIN THOMAS   [address] [place] [id number]      "

## 2021-04-19 NOTE — LETTER
"  4/19/2021      RE: Rashaad Silva   [address] [facility] [address]       ..Rashaad is a 25 year old who is being evaluated via a billable video visit.      How would you like to obtain your AVS? Mail a copy  If the video visit is dropped, the invitation should be resent by: Text to cell phone:   Will anyone else be joining your video visit? No      CLINICAL NUTRITION SERVICES - PEDIATRIC ASSESSMENT NOTE     REASON FOR ASSESSMENT  Rashaad Silva is a 25 year old male seen by the dietitian for consult regarding PKU.       ANTHROPOMETRICS  From 2019  Height: 175.4 cm (69 in)  Weight: 72.7 kg  BMI: 23.7     Comments: per patient, weight is stable    NUTRITION HISTORY  Patient follows a low protein diet (undocumented PHE goal; likely 300-450 mg/day).     Usual intake stated as:     Breakfast (7-8 am): fruit, pizza, or veggie sandwich  Afternoon (4-5 pm): soups, chips/salsa, crackers    -patient now works nights so sleeps most of the day  -feels he has a good understanding of what foods he eats that makes him high.  Mostly eats small snacks and throughout the day.  Most items are \"easy stuff\" and not much prep involved.     PKU FORMULA  PKU Sphere 20 - three packets daily    3 packages daily provides 360 kcals/day (5 kcal/kg), 60 grams PE (0.8 g/kg), 12.6 mcg Vitamin D, 19.8 mg iron, and 1059 mg calcium.  Patient states he aims for 3, but most of the time actually takes 2 because he forgets.    LABS  Labs reviewed;       PHE TYR  7/26/20 11.5 1.4  12/14/19 12.3 2.1  10/13/19 8.9 1.6  8/18/19 8.8 1.3    MEDICATIONS  Medications reviewed; includes daily MVI  NOTE:  responsive to Kuvan but discontinued due to side effects     ASSESSED NUTRITION NEEDS:  Estimated Energy Needs: 30-35 kcal/kg or 0099-1119 kcal/day  Estimated Protein Needs: RDA for age = 0.8 g/kg, optimal range for age/PKU: 0.8-1.2  Estimated PHE Needs: typically < 10 mg/kg/day  Micronutrient Needs: 1000 mg calcium, 600 mg Vitamin D, 8 mg " iron     NUTRITION DIAGNOSIS:  Impaired nutrient utilization related to diagnosis of PKU as evidenced by elevated serum PHE levels.     INTERVENTIONS  Nutrition Prescription  Meet 100% estimated kcal, protein, PHE, vitamin/mineral needs through low protein diet + PKU formula    Nutrition Education:   Provided nutrition education on continuing low protein diet + PKU formula.     Reviewed weight, current intakes, and most recent PHE levels.  -Patient stated that he had discussed with MD/team the possibility of restarting Kuvan.  We discussed this and RD encouraged him to also consider taking it regularly again.  Suggested he send a current level as well as another one once he's been taking Kuvan for a week so he can note changes in blood level and possible changes in symptoms of higher phe levels.    -Encouraged setting reminder on his phone to take all 3 packages Sphere for nutritional adequacy.     Goals  1. Weight maintenance  -goal met per verbal report  2. Meet >85% estimated kcal, protein, PHE, vitamin/mineral needs through low protein diet + PKU formula  -goal not met, not meeting formula needs most days  3. PHE levels 2-6 mg/dL  -goal not met (last level 11.5 mg/dL; but also inadequate submission of blood levels to really assess)    FOLLOW UP/MONITORING  Energy Intake - adequacy  Macronutrient intake - low protein + PKU formula  Anthropometric measurements - weight maintenance    Time spent on phone with patient: 15 minutes     Kassy Barajas RD, LD

## 2021-04-19 NOTE — LETTER
"  2021      RE: Rashaad Silva   [address] [address]       How would you like to obtain your AVS? Mail a copy  If the video visit is dropped, the invitation should be resent by: Send to e-mail at: [email or web address]  Will anyone else be joining your video visit? Dr. Gray Bowman (Pharmacist), Dr. Mariela De Jesus (Research physician) and Dr. Joseline Zarco (Clinical physician)     ULISES Waters    Video-Visit Details    Type of service:  Video Visit    Video Start Time: 3:00 PM  Video End Time (time video stopped): 3:40 PM    Originating Location (pt. Location): Home    Distant Location (provider location):  [facility]    Mode of Communication:  Video Conference via [employer]                                                      A partnership of [place] and [facility]   Advanced Therapies  [facility] [address] [place] [address]  Phone: [phone]  Fax: [phone]  Date: 2021      Patient:  Rashaad Silva   :   1995   MRN:     [id number]      Rashaad Silva   [address] [address]    Dear Rashaad Silva,    CHIEF COMPLAINT:     Thank you for sending Rashaad Silva, a 25 year old male, to the [facility] \"PKU and Maternal PKU Clinic\" for consultation and treatment of phenylketonuria (PKU).    PAST MEDICAL HISTORY:    From the oral history, and medical records that are available, these items are noted:    Patient Active Problem List    Diagnosis Date Noted     Kidney stone 2019     Priority: Medium     Nephrolithiasis 2013     Priority: Medium     Microscopic hematuria 2013     Priority: Medium     Elevated homocysteine 2013     Priority: Medium     B-complex deficiency 10/12/2012     Priority: Medium     Problem list name updated by automated process. Provider to review       PHENYLKETONURIA - PKU, Kuvan responsive      " Priority: Sharon Neil has not been on Kuvan for some time due to insurance issues.He was previously seen on 1/20/2019. No major hospitalizations or surgeries since then.     FAMILY HISTORY: A brief family medical history was reviewed.  REVIEW OF SYSTEMS: The review of systems negative for new eye, ear, heart, lung, liver, spleen, gastrointestinal, bone, muscle, integumentary, endocrinologic, brain or psychiatric issues except as noted above.    PHYSICAL EXAMINATION:   General: The patient is oriented to person, place and time at an age-appropriate manner.   HEENT: The facial features are normal and symmetric. The ears are of normal position and configuration and hearing is grossly normal.  The oropharynx is benign and the tongue protrudes normally without fasciculations.  Neck: The neck appears to be supple with full range of motion  Chest: Does not appear to be tachypneic or in any respiratory distress.  Heart:  Kofi Franklin  appears well perfused.  Abdomen: Not examined.  Extremities: The extremities are of normal configuration.  Back: The back was straight without scoliosis.   Integument: The visible part of the integument is of normal appearance without significant changes in pigmentation, birthmarks, or lesions.  Neuromuscular:  Mental Status Exam: Alert, awake. Fully oriented. No dysarthria, no dysphasia. Speech of normal fluency.     LABORATORY RESULTS: Laboratory studies from the past year were reviewed.  Results for KOFI FRANKLIN (MRN [id number]) as of 4/19/2021 15:02   Ref. Range 6/16/2019 13:15 8/18/2019 15:00 10/13/2019 15:30 12/14/2019 12:00 7/26/2020 14:00   Phenylalanine mg/dl Latest Ref Range: 0.5 - 1.6 mg/dL 9.4 (H) 8.8 (H) 8.9 (H) 12.3 (H) 11.5 (H)   Results for KOFI FRANKLIN (MRN [id number]) as of 4/19/2021 15:02   Ref. Range 6/16/2019 13:15 8/18/2019 15:00 10/13/2019 15:30 12/14/2019 12:00 7/26/2020 14:00   Tyrosine Latest Ref Range: 0.6 - 2.4 mg/dL 1.7 1.3 1.6 2.1 1.4  "      ASSESSMENT:  1. PKU  2. On Kuvan. But Rashaad has not been taking it due to insurance issues  3. High Phe levels  4. Not on PKU formulas  5. On Phe restricted diet    PLAN/RECOMMENDATIONS:  1. Continue low phenylalanine diet.  2. Send blood \"tyrosine and phenylalanine\" levels monthly.  3. Metabolic PKU Dietician Consult today.  4. Clinical Pharmacotherapy consultation with Thanh MalaveD.  5. Return to PKU Clinic in  3 months to evaluate Kuvan re-initiation      With warmest regards,     Andrews Villanueva PhD MD  Professor of Pediatrics  Medical Director, Advanced Therapies Program  Medical Director, PKU and Maternal PKU Clinic    Appointments: [phone]      Monday: [facility]rapies for Lysosomal Diseases Clinic   Monday PM: PKU Clinic    Kassy Barajas, Registered Dietician: [phone]  Gena Gomez, MS, [facility] [phone]  Gray Bowman, PharmD, Pharmacotherapy Specialist: [phone]      Copy to Primary Care Practitioner:      Dr. Alvaro Scruggs  [facility]   [address] [address]  [place] [facility] [address]    Copy  to patient:  JYOTHI FRANKLIN THOMAS   [address] [place] [phone]          "

## 2021-04-19 NOTE — PATIENT INSTRUCTIONS
PKU and Maternal PKU Clinic  [facility]  Pediatric Specialty Clinic ([facility])      Formula/ Diet   We did not make any changes to your diet/ medical formula today.          Medications   We will work on re initiation of Kuvan       Follow up visit    3 months       Helpful Numbers   To schedule appointments:              Ligia [phone]  Pediatric Call Center for [facility]: [phone]  Radiology/ Imaging/ Echocardiogram: [phone]   Services:   [phone]     For questions about your/ your child's medical condition:          Dr. Scar Villanueva M.D, Ph.D          Dr. Joseline Zarco M.D.                   For questions about medications/ supplies:          Dr. Gray Bowman Pharm D               Ph: [phone]    For questions about the research program you have enrolled in:           Dr. Mariela CARLOS, CCRP               Ph: [phone]    For questions about your child's nutrition:   Kassy Barajas RD  Ph: [phone]    For questions about genetic counseling or genetic testing:          Gena Gomez M.S, [facility]             Ph: [phone]              If you have not already done so consider signing up for [employer] LS15 by speaking with the person at the  on your way out or go to [email or web address] to sign up online.   [employer] LS15 enables easy and confidential communication with your care team.

## 2021-05-05 NOTE — PROGRESS NOTES
"..Rashaad is a 25 year old who is being evaluated via a billable video visit.      How would you like to obtain your AVS? Mail a copy  If the video visit is dropped, the invitation should be resent by: Text to cell phone:   Will anyone else be joining your video visit? No      CLINICAL NUTRITION SERVICES - PEDIATRIC ASSESSMENT NOTE     REASON FOR ASSESSMENT  Rashaad Silva is a 25 year old male seen by the dietitian for consult regarding PKU.       ANTHROPOMETRICS  From 2019  Height: 175.4 cm (69 in)  Weight: 72.7 kg  BMI: 23.7     Comments: per patient, weight is stable    NUTRITION HISTORY  Patient follows a low protein diet (undocumented PHE goal; likely 300-450 mg/day).     Usual intake stated as:     Breakfast (7-8 am): fruit, pizza, or veggie sandwich  Afternoon (4-5 pm): soups, chips/salsa, crackers    -patient now works nights so sleeps most of the day  -feels he has a good understanding of what foods he eats that makes him high.  Mostly eats small snacks and throughout the day.  Most items are \"easy stuff\" and not much prep involved.     PKU FORMULA  PKU Sphere 20 - three packets daily    3 packages daily provides 360 kcals/day (5 kcal/kg), 60 grams PE (0.8 g/kg), 12.6 mcg Vitamin D, 19.8 mg iron, and 1059 mg calcium.  Patient states he aims for 3, but most of the time actually takes 2 because he forgets.    LABS  Labs reviewed;       PHE TYR  7/26/20 11.5 1.4  12/14/19 12.3 2.1  10/13/19 8.9 1.6  8/18/19 8.8 1.3    MEDICATIONS  Medications reviewed; includes daily MVI  NOTE:  responsive to Kuvan but discontinued due to side effects     ASSESSED NUTRITION NEEDS:  Estimated Energy Needs: 30-35 kcal/kg or 3672-5980 kcal/day  Estimated Protein Needs: RDA for age = 0.8 g/kg, optimal range for age/PKU: 0.8-1.2  Estimated PHE Needs: typically < 10 mg/kg/day  Micronutrient Needs: 1000 mg calcium, 600 mg Vitamin D, 8 mg iron     NUTRITION DIAGNOSIS:  Impaired nutrient utilization related to diagnosis of PKU as " evidenced by elevated serum PHE levels.     INTERVENTIONS  Nutrition Prescription  Meet 100% estimated kcal, protein, PHE, vitamin/mineral needs through low protein diet + PKU formula    Nutrition Education:   Provided nutrition education on continuing low protein diet + PKU formula.     Reviewed weight, current intakes, and most recent PHE levels.  -Patient stated that he had discussed with MD/team the possibility of restarting Kuvan.  We discussed this and RD encouraged him to also consider taking it regularly again.  Suggested he send a current level as well as another one once he's been taking Kuvan for a week so he can note changes in blood level and possible changes in symptoms of higher phe levels.    -Encouraged setting reminder on his phone to take all 3 packages Sphere for nutritional adequacy.     Goals  1. Weight maintenance  -goal met per verbal report  2. Meet >85% estimated kcal, protein, PHE, vitamin/mineral needs through low protein diet + PKU formula  -goal not met, not meeting formula needs most days  3. PHE levels 2-6 mg/dL  -goal not met (last level 11.5 mg/dL; but also inadequate submission of blood levels to really assess)    FOLLOW UP/MONITORING  Energy Intake - adequacy  Macronutrient intake - low protein + PKU formula  Anthropometric measurements - weight maintenance    Time spent on phone with patient: 15 minutes     Kassy Barajas, BRITANY, LD

## 2021-11-18 DIAGNOSIS — E70.1 PHENYLKETONURIA (PKU) (H): ICD-10-CM

## 2021-11-18 RX ORDER — SAPROPTERIN DIHYDROCHLORIDE 500 MG/1
1500 POWDER, FOR SOLUTION ORAL
Qty: 90 EACH | Refills: 11 | Status: SHIPPED | OUTPATIENT
Start: 2021-11-18 | End: 2022-06-08

## 2021-11-26 ENCOUNTER — TELEPHONE (OUTPATIENT)
Dept: CONSULT | Facility: CLINIC | Age: 26
End: 2021-11-26
Payer: COMMERCIAL

## 2021-11-26 NOTE — TELEPHONE ENCOUNTER
Holding Rx in [place]. Per emails from Gray. We are trying to confirm if Tommy has been getting his Kuvan and what pharmacy he is using. I had left several message at 2 of the phone numbers on file. The third number the mailbox is full and I am unable to leave a message. I did a test claim through [employer] and the claim goes through with a co-pay of $1,028.83 but it looks like there is some sort of restriction with the insurance that does not allow [facility] to ship or deliver the medication and retail sites are not able to carry this medication so there is no  option available through [employer]. Called [facility] to see if they have been filling Tommy's Kuvan and they said they haven't filled Kuvan for him since 2016.

## 2021-12-22 NOTE — TELEPHONE ENCOUNTER
Never received return call from Rashaad. Left another message. Also sent follow up email to Daniela

## 2021-12-30 NOTE — TELEPHONE ENCOUNTER
Sent email to Gray to see if he has any insight on Rashaad. Several unsuccessful attempts to reach him. Not sure if he has been filling his medication or what pharmacy he is using. Last fill I was able to track was with [facility] in 2016.

## 2022-01-18 NOTE — TELEPHONE ENCOUNTER
Made final attempt to reach Rashaad but still unable to get a hold of him. Have left messages for 2 months with no return calls. Not able to get any confirmation that he is filling the Sapropterin or what pharmacy he would be getting it from. Releasing rx to [facility] to put on file in case he attempts to contact us in the future.

## 2022-06-02 ENCOUNTER — TELEPHONE (OUTPATIENT)
Dept: CONSULT | Facility: CLINIC | Age: 27
End: 2022-06-02
Payer: COMMERCIAL

## 2022-06-08 ENCOUNTER — TELEPHONE (OUTPATIENT)
Dept: CONSULT | Facility: CLINIC | Age: 27
End: 2022-06-08
Payer: COMMERCIAL

## 2022-06-08 DIAGNOSIS — E70.1 PHENYLKETONURIA (PKU) (H): ICD-10-CM

## 2022-06-08 RX ORDER — SAPROPTERIN DIHYDROCHLORIDE 500 MG/1
1500 POWDER, FOR SOLUTION ORAL
Qty: 90 EACH | Refills: 11 | Status: SHIPPED | OUTPATIENT
Start: 2022-06-08

## 2022-06-08 NOTE — TELEPHONE ENCOUNTER
PA Initiation    Medication: [place] Pending Saproptrerin  Insurance Company: [employer] - Phone [phone] Fax [phone]  Pharmacy Filling the Rx:    Filling Pharmacy Phone:    Filling Pharmacy Fax:    Start Date: 6/8/2022

## 2022-06-20 NOTE — TELEPHONE ENCOUNTER
Prior Authorization Approval    Authorization Effective Date: 6/14/2022  Authorization Expiration Date: 8/13/2022  Medication: [place] Pending Saproptrerin  Approved Dose/Quantity:   Reference #: Key: [id number]   Insurance Company: [employer] - Phone [phone] Fax [phone]  Expected CoPay:       CoPay Card Available:      Foundation Assistance Needed:    Which Pharmacy is filling the prescription (Not needed for infusion/clinic administered): [facility] - [place], [place] - [address] [place] [address] COURT  Pharmacy Notified:    Patient Notified: Yes-called and left message letting him know that I received a temporary approval but an appointment is needed for further approval.

## 2022-07-15 ENCOUNTER — TELEPHONE (OUTPATIENT)
Dept: CONSULT | Facility: CLINIC | Age: 27
End: 2022-07-15

## 2022-07-15 NOTE — TELEPHONE ENCOUNTER
PA Initiation    Medication: PA Pending Sapropterin renewal  Insurance Company: [employer] - Phone [phone] Fax [phone]  Pharmacy Filling the Rx:    Filling Pharmacy Phone:    Filling Pharmacy Fax:    Start Date: 7/15/2022

## 2022-07-20 NOTE — TELEPHONE ENCOUNTER
Prior Authorization Approval    Authorization Effective Date:  7/15/2022  Authorization Expiration Date:  7/15/2023  Medication: [place] approved Sapropterin renewal  Approved Dose/Quantity:   Reference #:     Insurance Company: [employer] - Phone [phone] Fax [phone]  Expected CoPay:       CoPay Card Available:      Foundation Assistance Needed:    Which Pharmacy is filling the prescription (Not needed for infusion/clinic administered):    Pharmacy Notified:    Patient Notified:

## 2022-08-24 ENCOUNTER — TELEPHONE (OUTPATIENT)
Dept: CONSULT | Facility: CLINIC | Age: 27
End: 2022-08-24

## 2022-08-24 NOTE — TELEPHONE ENCOUNTER
I left Rashaad a voice message about setting a follow-up virtual visit in the PKU clinic to meet with Dr. Villanueva for this coming Tuesday, August 30th at 9, 9:30, or 10am.  Dr. Villanueva would like to follow-up on how Rashaad's  progress is coming along. Please contact Dr. Villanueva s coordinator Ligia at [phone] to get schedule.        Thank you,   Ligia Sosa

## 2022-11-15 ENCOUNTER — LAB (OUTPATIENT)
Dept: LAB | Facility: CLINIC | Age: 27
End: 2022-11-15

## 2022-11-15 PROCEDURE — 84999 UNLISTED CHEMISTRY PROCEDURE: CPT

## 2022-11-15 PROCEDURE — 84510 ASSAY OF TYROSINE: CPT

## 2022-11-22 LAB
Lab: NORMAL
PERFORMING LABORATORY: NORMAL
SPECIMEN STATUS: NORMAL
TEST NAME: NORMAL

## 2022-11-25 LAB — MAYO MISC RESULT: ABNORMAL

## 2023-02-06 ENCOUNTER — LAB (OUTPATIENT)
Dept: LAB | Facility: CLINIC | Age: 28
End: 2023-02-06
Payer: COMMERCIAL

## 2023-02-10 LAB
PHE SERPL-MCNC: 10.2 MG/DL (ref 0.5–1.6)
PHE SERPL-SCNC: 61.5 UMOL/DL (ref 3–10)
TYROSINE SERPL-MCNC: 2 MG/DL (ref 0.6–2.4)
TYROSINE SERPL-SCNC: 10.8 UMOL/DL (ref 4–13)

## 2023-02-15 ENCOUNTER — TELEPHONE (OUTPATIENT)
Dept: NUTRITION | Facility: CLINIC | Age: 28
End: 2023-02-15
Payer: COMMERCIAL

## 2023-07-25 ENCOUNTER — VIRTUAL VISIT (OUTPATIENT)
Dept: PEDIATRICS | Facility: CLINIC | Age: 28
End: 2023-07-25
Attending: PEDIATRICS
Payer: COMMERCIAL

## 2023-07-25 VITALS — HEIGHT: 69 IN | WEIGHT: 165 LBS | BODY MASS INDEX: 24.44 KG/M2

## 2023-07-25 DIAGNOSIS — E70.1 PHENYLKETONURIA (PKU) (H): Primary | ICD-10-CM

## 2023-07-25 PROCEDURE — 99213 OFFICE O/P EST LOW 20 MIN: CPT | Mod: VID | Performed by: PEDIATRICS

## 2023-07-25 RX ORDER — HYDROXYZINE HYDROCHLORIDE 25 MG/1
25 TABLET, FILM COATED ORAL PRN
COMMUNITY
Start: 2023-03-23 | End: 2024-03-24

## 2023-07-25 ASSESSMENT — PAIN SCALES - GENERAL: PAINLEVEL: NO PAIN (0)

## 2023-07-25 NOTE — LETTER
2023      RE: Rashaad Silva   [address] [place] [id number]     Dear Colleague,    Thank you for the opportunity to participate in the care of your patient, Rashaad Silva, at the [facility] at [facility]. Please see a copy of my visit note below.                Phenylketonuria (PKU) Clinic  [address] [address]  Phone: [phone]  Fax: [phone]  Date: 2023      Patient:  Rashaad Silva   :   1995   MRN:     [id number]      Rashaad Silva   [address] [place] [id number]    Dear Dr. Alvaro Scruggs and Rashaad Campbellner,    CHIEF COMPLAINT:     I had the pleasure of seeing Rashaad Silva in the PKU and Maternal PKU Clinic at the [facility] regarding phenylketonuria (PKU). Patient is here for evaluation and treatment.      Since the last visit, there have been no hospitalizations, emergency department visits, or other major changes in medical care.    PAST MEDICAL HISTORY:    These list of past medical problems includes:    Patient Active Problem List   Diagnosis    PHENYLKETONURIA - PKU, Kuvan responsive    B-complex deficiency    Elevated homocysteine    Microscopic hematuria    Nephrolithiasis    Kidney stone     Rashaad Silva is a 27 year old male with PKU, last seen in clinic in 2021. He is currently managing his PKU with formula and diet alone. Patient was previously on Kuvan but has not been on therapy since 2019. He initially stopped therapy due to changes in job and insurance as well as some upset stomach he experienced while on therapy. He is not interested in restarting therapy at this time. Patient is also not interested in Palynziq.     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   FAMILY HISTORY: A brief family medical history was reviewed.  MEDICATIONS:   Current Outpatient  Medications   Medication Sig    multivitamin, therapeutic with minerals (MULTI-VITAMIN) TABS Take 1 tablet by mouth daily    Sapropterin Dihydrochloride (KUVAN) 500 MG PACK Take 1,500 mg by mouth daily with food (Patient not taking: Reported on 7/25/2023)     No current facility-administered medications for this visit.     REVIEW OF SYSTEMS: The review of systems negative for new eye, ear, heart, lung, liver, spleen, gastrointestinal, bone, muscle, integumentary, endocrinologic, brain or psychiatric issues except as noted above.  PHYSICAL EXAMINATION:  vITAL SIGNS/WEIGHT:   Wt Readings from Last 2 Encounters:   07/25/23 165 lb (74.8 kg)   01/21/19 160 lb 0.9 oz (72.6 kg)        PHYSICAL EXAMINATION:   General: The patient is oriented to person, place and time at an age-appropriate manner.   HEENT: The facial features are normal and symmetric. The ears are of normal position and configuration and hearing is grossly normal.  Neck: The neck appears to have full range of motion  Chest: Does not appear to be tachypneic or in any respiratory distress.  Heart:  Rashaad CATHY Ricardo  appears well perfused.  Abdomen: Not examined.  Extremities: The extremities are of normal configuration without visible contractures.  Back: Not examined.   Integument: The visible part of the integument is of normal appearance without significant changes in pigmentation, birthmarks, or lesions.  Neuromuscular:  Mental Status Exam: Alert, awake. Fully oriented. No dysarthria, no dysphasia. Speech of normal fluency.  Appears to have normal strength and full range of motion of the extremities.      LABORATORY RESULTS: Previous studies showed pathologically elevated blood phenylalanine levels as well as specific PAH gene mutations and excluded disorders of biopterin recycling. Laboratory studies from the past year were reviewed.   Latest Reference Range & Units 12/14/19 12:00 07/26/20 14:00 02/05/23 10:00   Phenylalanine umol/dL 3.0 - 10.0 umol/dL   " 61.5 (H)   Phenylalanine mg/dl 0.5 - 1.6 mg/dL 12.3 (H) 11.5 (H) 10.2 (H)   Tyrosine 0.6 - 2.4 mg/dL 2.1 1.4    Tyrosine mg/dL 0.6 - 2.4 mg/dL   2.0   Tyrosine umol/dL 4.0 - 13.0 umol/dL   10.8   (H): Data is abnormally high  ASSESSMENT:  Phenylketonuria disease (PKU)  On phenylalanine restricted diet  On PKU formula  Moderate phenylalanine (6-15 mg/dL) blood levels, aiming at optimal levels of 6 mg/dL or lower  Inadequate response to current therapy  Discussed Kuvan and Palynziq pharmacotherapy options for management, however, patient is not interested in starting therapy at this time    PLAN/RECOMMENDATIONS:  Continue low phenylalanine diet.  Send fasting blood \"tyrosine and phenylalanine\" levels monthly  Metabolic PKU Dietician consult today with Kassy Barajas  Clinical Pharmacotherapy consultation with Mabel Isaac PharmD if interested in restarting Kuvan or learning more about Palynziq  Good response to the current treatment  Return to PKU Clinic in 12 months.     FOLLOW-UP PLAN:  If you are returning to clinic to review specific laboratory tests, please call the Genetic Counselor (see phone numbers below) to confirm that we have received all of the results from reference laboratories prior to your appointment. If we have not received all of the test results, please discuss re-scheduling your appointment.      With warmest regards,     Andrews Villanueva PhD MD  Medical Director, PKU Clinic  Professor of Pediatrics, Medical School, and  Experimental and Clinical Pharmacology, [employer]    Monday mornings: [facility]  Tuesday mornings : [facility] PKU [facility]    Pharmacotherapy Consultant:  Mabel Isaac, PharmD, Pharmacotherapy for Metabolic Disorders (PIMD): [phone]    Genetic Counselor:  Gena Gomez MS, [facility] (Genetic test Results): [phone]    Metabolic Dietician:  Kassy Barajas, Registered Dietician: [phone]    Advanced " Therapies Clinic Scheduler:  Ligia Sosa, [phone]    Nurse Coordinator, PKU, Metabolism and Genetics:  Angela Harmon, RNCC, [phone]    Research Associate  BREANNA Coughlin, CCRP, [phone]    Copies:    Dr. Alvaro Scruggs  [facility] [address]  [place] [address]    Rashaad CATHY Silva   [address] [place] [phone]          Please do not hesitate to contact me if you have any questions/concerns.     Sincerely,       Scar Villanueva MD

## 2023-07-25 NOTE — LETTER
7/25/2023      RE: Rashaad Silva   [address] [address]     Dear Colleague,    Thank you for the opportunity to participate in the care of your patient, Rashaad Silva, at the [facility] at [facility]. Please see a copy of my visit note below.         CLINICAL NUTRITION SERVICES - ASSESSMENT NOTE     REASON FOR ASSESSMENT  Rashaad Silva is a 27 year old male seen by the dietitian for consult regarding PKU.       ANTHROPOMETRICS  Height: 175.4 cm or 69 in  Weight: 75 kg or 165 lbs  BMI: 24.4     Comments:   -patient states weight has been stable and he does not have any concerns with it     NUTRITION HISTORY  Patient follows a low protein diet (undocumented PHE goal; likely 300-450 mg/day).    Patient states his typical schedule as:    -no breakfast on a regular basis  -snacks such as a bag of chips or something out of vending machine during the day at work  -one larger meal in the evening; spaghetti with sauce (no meat) or tater tot hot dish (no meat/cheese)     PKU FORMULA  PKU Sphere 20 - three packets daily (prescribed)     Patient states most days he is getting in 1-2 packets/day of the PKU Sphere: provides 240 kcals/day (3 kcal/kg), 40 gm PE (0.5 g/kg), 8.4 mcg Vitamin D, 13.2 mg iron, and 706 mg calcium.      3 packages daily provides 360 kcals/day (5 kcal/kg), 60 grams PE (0.8 g/kg), 12.6 mcg Vitamin D, 19.8 mg iron, and 1059 mg calcium.       LABS  Labs reviewed;             PHE     TYR  2/5/23  10.2 2  11/15/22 7.9 0.8     MEDICATIONS  Medications reviewed; includes daily MVI  NOTE:  responsive to Kuvan but discontinued due to side effects (stomachaches)     ASSESSED NUTRITION NEEDS:  Estimated Energy Needs: [place] (1710) x 1.2-1.3 = 27-30 kcal/kg  Estimated Protein Needs: DRI/age = 0.8 g/kg, optimal range for age/PKU: 0.8-1.2 g/kg  Estimated PHE Needs: typically < 10  mg/kg/day  Micronutrient Needs: 1000 mg calcium, 15 mcg Vitamin D, 8 mg iron daily     NUTRITION DIAGNOSIS:  Impaired nutrient utilization related to diagnosis of PKU as evidenced by elevated serum PHE levels.     INTERVENTIONS  Nutrition Prescription  Meet 100% estimated kcal, protein, PHE, vitamin/mineral needs through low protein diet + PKU formula    Nutrition Education:   Provided nutrition education on continuing low protein diet + PKU formula.     Reviewed weight, current intakes, and most recent PHE levels.  -Reviewed goals for treatment and nutritional adequacy; at this time, patient would like to continue treatment of diet without Kuvan or Palynziq.  Discussed benefit of full formula prescription on satiety and nutritional adequacy contributing to energy levels.    -Encouraged a breakfast incorporated into daily routine to spread protein throughout the day vs. All in one meal  -Discussed possible benefits of re-trying Kuvan again, as it has been several years  -Recommended goal of monthly levels    Goals  1. Weight maintenance  -goal met per verbal report  2. Meet >85% estimated kcal, protein, PHE, vitamin/mineral needs through low protein diet + PKU formula  -goal not met, meeting 67% formula needs  3. PHE levels 2-6 mg/dL  -goal not met; most recent level 10.2 mg/dL, however no recent levels to better assess current status    FOLLOW UP/MONITORING  Energy Intake - adequacy  Macronutrient intake - low protein + PKU formula  Anthropometric measurements - weight maintenance     Time spent on phone with patient: 15 minutes    Kassy Barajas RD, LD

## 2023-07-25 NOTE — NURSING NOTE
Is the patient currently in the state of [place]? NO    Visit mode:VIDEO    If the visit is dropped, the patient can be reconnected by: VIDEO VISIT: Text to cell phone: [phone]    Will anyone else be joining the visit? NO      How would you like to obtain your AVS? Mail a copy    Are changes needed to the allergy or medication list? NO    Please remove meds marked not taking and flagged for removal.    Reason for visit: RECHECK    Pain more than one location: no  Estefania Espinoza   [place] in [place]

## 2023-07-25 NOTE — PROGRESS NOTES
Rashaad is a 27 year old who is being evaluated via a billable video visit.      How would you like to obtain your AVS? Mail a copy  If the video visit is dropped, the invitation should be resent by: Text to cell phone: [phone]  Will anyone else be joining your video visit? Dr. Mabel Isaac, PharmD. (Pharmacist)         Video-Visit Details    Type of service:  Video Visit     Visit Start Time: 11:00AM  Visit End Time: 11:32AM    Originating Location (pt. Location): Home  Distant Location (provider location):  Off-site  Platform used for Video Visit: [facility]              Phenylketonuria (PKU) Clinic  [facility] [address] [place] [address]  Phone: [phone]  Fax: [phone]  Date: 2023      Patient:  Rashaad Silva   :   1995   MRN:     [id number]      Rashaad Silva   [address] [facility] [address]    Dear Dr. Alvaro Scruggs and Rashaad CATHY Ricardo,    CHIEF COMPLAINT:     I had the pleasure of seeing Rashaad Silva in the PKU and Maternal PKU Clinic at the [facility] regarding phenylketonuria (PKU). Patient is here for evaluation and treatment.      Since the last visit, there have been no hospitalizations, emergency department visits, or other major changes in medical care.    PAST MEDICAL HISTORY:    These list of past medical problems includes:    Patient Active Problem List   Diagnosis    PHENYLKETONURIA - PKU, Kuvan responsive    B-complex deficiency    Elevated homocysteine    Microscopic hematuria    Nephrolithiasis    Kidney stone     Rashaad Silva is a 27 year old male with PKU, last seen in clinic in 2021. He is currently managing his PKU with formula and diet alone. Patient was previously on Kuvan but has not been on therapy since 2019. He initially stopped therapy due to changes in job and insurance as well as some upset stomach he experienced while on therapy. He is not interested in restarting therapy at this time.  Patient is also not interested in Palynziq.     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   FAMILY HISTORY: A brief family medical history was reviewed.  MEDICATIONS:   Current Outpatient Medications   Medication Sig    multivitamin, therapeutic with minerals (MULTI-VITAMIN) TABS Take 1 tablet by mouth daily    Sapropterin Dihydrochloride (KUVAN) 500 MG PACK Take 1,500 mg by mouth daily with food (Patient not taking: Reported on 7/25/2023)     No current facility-administered medications for this visit.     REVIEW OF SYSTEMS: The review of systems negative for new eye, ear, heart, lung, liver, spleen, gastrointestinal, bone, muscle, integumentary, endocrinologic, brain or psychiatric issues except as noted above.  PHYSICAL EXAMINATION:  vITAL SIGNS/WEIGHT:   Wt Readings from Last 2 Encounters:   07/25/23 165 lb (74.8 kg)   01/21/19 160 lb 0.9 oz (72.6 kg)        PHYSICAL EXAMINATION:   General: The patient is oriented to person, place and time at an age-appropriate manner.   HEENT: The facial features are normal and symmetric. The ears are of normal position and configuration and hearing is grossly normal.  Neck: The neck appears to have full range of motion  Chest: Does not appear to be tachypneic or in any respiratory distress.  Heart:  Rashaad CATHY Ricardo  appears well perfused.  Abdomen: Not examined.  Extremities: The extremities are of normal configuration without visible contractures.  Back: Not examined.   Integument: The visible part of the integument is of normal appearance without significant changes in pigmentation, birthmarks, or lesions.  Neuromuscular:  Mental Status Exam: Alert, awake. Fully oriented. No dysarthria, no dysphasia. Speech of normal fluency.  Appears to have normal strength and full range of motion of the extremities.      LABORATORY RESULTS: Previous studies showed pathologically elevated blood phenylalanine levels as well as specific PAH gene mutations and  "excluded disorders of biopterin recycling. Laboratory studies from the past year were reviewed.   Latest Reference Range & Units 12/14/19 12:00 07/26/20 14:00 02/05/23 10:00   Phenylalanine umol/dL 3.0 - 10.0 umol/dL   61.5 (H)   Phenylalanine mg/dl 0.5 - 1.6 mg/dL 12.3 (H) 11.5 (H) 10.2 (H)   Tyrosine 0.6 - 2.4 mg/dL 2.1 1.4    Tyrosine mg/dL 0.6 - 2.4 mg/dL   2.0   Tyrosine umol/dL 4.0 - 13.0 umol/dL   10.8   (H): Data is abnormally high  ASSESSMENT:  Phenylketonuria disease (PKU)  On phenylalanine restricted diet  On PKU formula  Moderate phenylalanine (6-15 mg/dL) blood levels, aiming at optimal levels of 6 mg/dL or lower  Inadequate response to current therapy  Discussed Kuvan and Palynziq pharmacotherapy options for management, however, patient is not interested in starting therapy at this time    PLAN/RECOMMENDATIONS:  Continue low phenylalanine diet.  Send fasting blood \"tyrosine and phenylalanine\" levels monthly  Metabolic PKU Dietician consult today with Kassy Barajas  Clinical Pharmacotherapy consultation with Mabel Isaac PharmD if interested in restarting Kuvan or learning more about Palynziq  Good response to the current treatment  Return to PKU Clinic in 12 months.     FOLLOW-UP PLAN:  If you are returning to clinic to review specific laboratory tests, please call the Genetic Counselor (see phone numbers below) to confirm that we have received all of the results from reference laboratories prior to your appointment. If we have not received all of the test results, please discuss re-scheduling your appointment.      With warmest regards,     Andrews Villanueva PhD MD  Medical Director, PKU Clinic  Professor of Pediatrics, Medical School, and  Experimental and Clinical Pharmacology, [employer]    Monday mornings: [facility] Lys[facility]  Tuesday mornings : [facility] PKU clinic    Pharmacotherapy Consultant:  Mabel Isaac, PharmD, Pharmacotherapy for " Metabolic Disorders (PIMD): [phone]    Genetic Counselor:  Gena Gomez, MS, [facility] (Genetic test Results): [phone]    Metabolic Dietician:  Kassy Barajas, Registered Dietician: [phone]    Advanced Therapies Clinic Scheduler:  Ligia Sosa, [phone]    Nurse Coordinator, PKU, Metabolism and Genetics:  Angela Harmon, KOLBYCC, [phone]    Research Associate  BREANNA Coughlin, CCRP, [phone]    Copies:    Dr. Alvaro Scruggs  [facility] [place] [address]  [place] [address]    Rashaad Silva   [address] [place] [phone]

## 2023-08-28 NOTE — PROGRESS NOTES
Rashaad is a 25 year old who is being evaluated via a billable video visit.       How would you like to obtain your AVS? Mail a copy  If the video visit is dropped, the invitation should be resent by: Text to cell phone:   Will anyone else be joining your video visit? No        CLINICAL NUTRITION SERVICES - ASSESSMENT NOTE     REASON FOR ASSESSMENT  Rashaad Silva is a 27 year old male seen by the dietitian for consult regarding PKU.       ANTHROPOMETRICS  Height: 175.4 cm or 69 in  Weight: 75 kg or 165 lbs  BMI: 24.4     Comments:   -patient states weight has been stable and he does not have any concerns with it     NUTRITION HISTORY  Patient follows a low protein diet (undocumented PHE goal; likely 300-450 mg/day).    Patient states his typical schedule as:    -no breakfast on a regular basis  -snacks such as a bag of chips or something out of vending machine during the day at work  -one larger meal in the evening; spaghetti with sauce (no meat) or tater tot hot dish (no meat/cheese)     PKU FORMULA  PKU Sphere 20 - three packets daily (prescribed)     Patient states most days he is getting in 1-2 packets/day of the PKU Sphere: provides 240 kcals/day (3 kcal/kg), 40 gm PE (0.5 g/kg), 8.4 mcg Vitamin D, 13.2 mg iron, and 706 mg calcium.      3 packages daily provides 360 kcals/day (5 kcal/kg), 60 grams PE (0.8 g/kg), 12.6 mcg Vitamin D, 19.8 mg iron, and 1059 mg calcium.       LABS  Labs reviewed;             PHE     TYR  2/5/23  10.2 2  11/15/22 7.9 0.8     MEDICATIONS  Medications reviewed; includes daily MVI  NOTE:  responsive to Kuvan but discontinued due to side effects (stomachaches)     ASSESSED NUTRITION NEEDS:  Estimated Energy Needs: [place] (1710) x 1.2-1.3 = 27-30 kcal/kg  Estimated Protein Needs: DRI/age = 0.8 g/kg, optimal range for age/PKU: 0.8-1.2 g/kg  Estimated PHE Needs: typically < 10 mg/kg/day  Micronutrient Needs: 1000 mg calcium, 15 mcg Vitamin D, 8 mg iron daily     NUTRITION  DIAGNOSIS:  Impaired nutrient utilization related to diagnosis of PKU as evidenced by elevated serum PHE levels.     INTERVENTIONS  Nutrition Prescription  Meet 100% estimated kcal, protein, PHE, vitamin/mineral needs through low protein diet + PKU formula    Nutrition Education:   Provided nutrition education on continuing low protein diet + PKU formula.     Reviewed weight, current intakes, and most recent PHE levels.  -Reviewed goals for treatment and nutritional adequacy; at this time, patient would like to continue treatment of diet without Kuvan or Palynziq.  Discussed benefit of full formula prescription on satiety and nutritional adequacy contributing to energy levels.    -Encouraged a breakfast incorporated into daily routine to spread protein throughout the day vs. All in one meal  -Discussed possible benefits of re-trying Kuvan again, as it has been several years  -Recommended goal of monthly levels    Goals  1. Weight maintenance  -goal met per verbal report  2. Meet >85% estimated kcal, protein, PHE, vitamin/mineral needs through low protein diet + PKU formula  -goal not met, meeting 67% formula needs  3. PHE levels 2-6 mg/dL  -goal not met; most recent level 10.2 mg/dL, however no recent levels to better assess current status    FOLLOW UP/MONITORING  Energy Intake - adequacy  Macronutrient intake - low protein + PKU formula  Anthropometric measurements - weight maintenance     Time spent on phone with patient: 15 minutes    Kassy Barajas RD, LD

## 2025-01-20 ENCOUNTER — TELEPHONE (OUTPATIENT)
Dept: CONSULT | Facility: CLINIC | Age: 30
End: 2025-01-20
Payer: COMMERCIAL

## 2025-01-20 NOTE — TELEPHONE ENCOUNTER
I left a voice message about coordinating a follow-up virtual visit in the PKU Clinic to meet Dr Villanueva for sometime this March.       To schedule an appointment, please contact Dr. Villanueva s coordinator, Ligia, at [phone]. She will be happy to help coordinate an appointment with you?     Thank you,      Ligia Sosa  Specialty    [facility]   Office: [phone]

## 2025-01-22 ENCOUNTER — TELEPHONE (OUTPATIENT)
Dept: CONSULT | Facility: CLINIC | Age: 30
End: 2025-01-22
Payer: COMMERCIAL

## 2025-01-22 NOTE — TELEPHONE ENCOUNTER
This is my second attempt on trying to reach Rashaad. Rashaad is due for his follow up in the PKU Clinic.     Please contact the clinic at [phone] to schedule an appointment.       Ligia

## 2025-02-25 ENCOUNTER — LAB (OUTPATIENT)
Dept: LAB | Facility: CLINIC | Age: 30
End: 2025-02-25
Payer: COMMERCIAL

## 2025-03-05 LAB
PHE SERPL-MCNC: 9.7 MG/DL (ref 0.5–1.6)
PHE SERPL-SCNC: 58.5 UMOL/DL (ref 3–10)
TYROSINE SERPL-MCNC: 2 MG/DL (ref 0.6–2.4)
TYROSINE SERPL-SCNC: 10.9 UMOL/DL (ref 4–13)

## 2025-04-13 ENCOUNTER — HEALTH MAINTENANCE LETTER (OUTPATIENT)
Age: 30
End: 2025-04-13